# Patient Record
Sex: FEMALE | Race: WHITE | NOT HISPANIC OR LATINO | Employment: FULL TIME | URBAN - NONMETROPOLITAN AREA
[De-identification: names, ages, dates, MRNs, and addresses within clinical notes are randomized per-mention and may not be internally consistent; named-entity substitution may affect disease eponyms.]

---

## 2023-03-04 PROBLEM — G47.11 IDIOPATHIC HYPERSOMNOLENCE: Status: ACTIVE | Noted: 2023-03-04

## 2023-03-04 PROBLEM — R76.8 POSITIVE DOUBLE STRANDED DNA ANTIBODY TEST: Status: ACTIVE | Noted: 2023-03-04

## 2023-03-04 PROBLEM — F41.9 ANXIETY AND DEPRESSION: Status: ACTIVE | Noted: 2023-03-04

## 2023-03-04 PROBLEM — J32.9 CHRONIC SINUSITIS: Status: ACTIVE | Noted: 2023-03-04

## 2023-03-04 PROBLEM — H83.3X9 SOUND SENSITIVITY: Status: ACTIVE | Noted: 2023-03-04

## 2023-03-04 PROBLEM — G43.909 MIGRAINE HEADACHE: Status: ACTIVE | Noted: 2023-03-04

## 2023-03-04 PROBLEM — R53.82 CHRONIC FATIGUE: Status: ACTIVE | Noted: 2023-03-04

## 2023-03-04 PROBLEM — F32.A ANXIETY AND DEPRESSION: Status: ACTIVE | Noted: 2023-03-04

## 2023-03-04 PROBLEM — R51.9 CHRONIC DAILY HEADACHE: Status: ACTIVE | Noted: 2023-03-04

## 2023-03-04 PROBLEM — J30.9 ALLERGIC RHINITIS: Status: ACTIVE | Noted: 2023-03-04

## 2023-03-04 PROBLEM — G47.8 UPPER AIRWAY RESISTANCE SYNDROME: Status: ACTIVE | Noted: 2023-03-04

## 2023-03-04 PROBLEM — F84.5 ASPERGER'S DISORDER (HHS-HCC): Status: ACTIVE | Noted: 2023-03-04

## 2023-03-04 PROBLEM — E66.3 OVERWEIGHT (BMI 25.0-29.9): Status: ACTIVE | Noted: 2023-03-04

## 2023-03-04 PROBLEM — R61 GENERALIZED HYPERHIDROSIS: Status: ACTIVE | Noted: 2023-03-04

## 2023-03-04 RX ORDER — VENLAFAXINE HYDROCHLORIDE 37.5 MG/1
1 CAPSULE, EXTENDED RELEASE ORAL EVERY MORNING
COMMUNITY
Start: 2022-09-22 | End: 2024-01-18 | Stop reason: ALTCHOICE

## 2023-03-04 RX ORDER — NORETHINDRONE ACETATE AND ETHINYL ESTRADIOL .03; 1.5 MG/1; MG/1
TABLET ORAL
COMMUNITY

## 2023-03-06 NOTE — PROGRESS NOTES
Subjective   Patient ID: Clair Cifuentes is a 27 y.o. female who presents for Fatigue (Having severe fatigue x 3 weeks /Increase appetite hair thinning ).    HPI     Anxiety and depression- seeing Larisa at [facility]   She is on venlafaxine now- moods have been a lot better   Previously on lexapro and wellbutrin but have been stopped     Chronic daily headaches- saw neurology in Oct 2022- likely tension headache plus bupropion related HA vs migraine-   Tried amitriptyline, topamax previously   No longer taking magnesium/B2  Nurtec as needed, along with ibuprofen or tylenol  Getting 1-2 tension headaches per month, mostly due to weather changes - much better than they were     She was seeing sleep medicine in 2019 for chronic fatigue, home sleep study at that time was nondiagnostic for sleep apnea, AHI 1.16, oxygen saturation angeli 87%, also showed evidence of upper airway resistance syndrome ; reports she had in-lab study as well     Here today for acute on chronic fatigue x 2-3 weeks  Hair thinning  Hungry all the time   Restarted her iron with no change yet  Sleep is fine at night, 8 hours, sleeps through the night  Feels well rested upon awakening  Not napping  Some exercise- rides horses   Not recent illness   No hx mono or covid   Her mom has hypothyroidism     Labs done in July 2022     Review of Systems   Constitutional:  Positive for fatigue. Negative for chills and fever.   Respiratory:  Negative for cough and shortness of breath.    Cardiovascular:  Negative for chest pain and palpitations.   Endocrine: Positive for polyphagia.       Objective   /69 (BP Location: Right arm, Patient Position: Sitting, BP Cuff Size: Large adult)   Pulse 90   Temp 36.9 °C (98.5 °F) (Temporal)   Resp 14   Wt 96.8 kg (213 lb 6.4 oz)   LMP  (LMP Unknown) Comment: Cont BC  BMI 33.42 kg/m²     Physical Exam    Constitutional: Well developed, well nourished, alert and in no acute distress   Eyes: Normal external  exam. Pupils equally round and reactive to light with normal accommodation and extraocular movements intact.  Neck: Supple, no lymphadenopathy or masses.   Cardiovascular: Regular rate and rhythm, normal S1 and S2, no murmurs, gallops, or rubs. Radial pulses normal. No peripheral edema.  Pulmonary: No respiratory distress, lungs clear to auscultation bilaterally. No wheezes, rhonchi, rales.  Skin: Warm, well perfused, normal skin turgor and color.   Neurologic: Cranial nerves II-XII grossly intact.   Psychiatric: Mood calm and affect normal.      Assessment/Plan   Problem List Items Addressed This Visit          Nervous    Chronic daily headache     Improved, using Nurtec as needed             Respiratory    Upper airway resistance syndrome       Endocrine/Metabolic    Class 1 obesity without serious comorbidity with body mass index (BMI) of 33.0 to 33.9 in adult       Other    Anxiety and depression     Improved on venlafaxine - [facility]          Chronic fatigue - Primary     Acute on chronic fatigue  Labs ordered  Refer sleep medicine for further evaluation          Relevant Orders    CBC and Auto Differential    Comprehensive Metabolic Panel    Hemoglobin A1C    TSH with reflex to Free T4 if abnormal    Vitamin D, Total    Ferritin    Iron and TIBC    Vitamin B12    Michael-Barr Virus Antibody Panel (VCA IgG/IgM, EA IgG, NA IgG)    Referral to Adult Sleep Medicine     Other Visit Diagnoses       Hair thinning        Polyphagia              Candy Villegas,   3/7/2023

## 2023-03-07 ENCOUNTER — OFFICE VISIT (OUTPATIENT)
Dept: PRIMARY CARE | Facility: CLINIC | Age: 28
End: 2023-03-07
Payer: COMMERCIAL

## 2023-03-07 ENCOUNTER — LAB (OUTPATIENT)
Dept: LAB | Facility: LAB | Age: 28
End: 2023-03-07
Payer: COMMERCIAL

## 2023-03-07 VITALS
DIASTOLIC BLOOD PRESSURE: 69 MMHG | RESPIRATION RATE: 14 BRPM | BODY MASS INDEX: 33.42 KG/M2 | WEIGHT: 213.4 LBS | HEART RATE: 90 BPM | SYSTOLIC BLOOD PRESSURE: 112 MMHG | TEMPERATURE: 98.5 F

## 2023-03-07 DIAGNOSIS — F41.9 ANXIETY AND DEPRESSION: ICD-10-CM

## 2023-03-07 DIAGNOSIS — R53.82 CHRONIC FATIGUE: Primary | ICD-10-CM

## 2023-03-07 DIAGNOSIS — L65.9 HAIR THINNING: ICD-10-CM

## 2023-03-07 DIAGNOSIS — R53.82 CHRONIC FATIGUE: ICD-10-CM

## 2023-03-07 DIAGNOSIS — E66.9 CLASS 1 OBESITY WITHOUT SERIOUS COMORBIDITY WITH BODY MASS INDEX (BMI) OF 33.0 TO 33.9 IN ADULT, UNSPECIFIED OBESITY TYPE: ICD-10-CM

## 2023-03-07 DIAGNOSIS — G47.8 UPPER AIRWAY RESISTANCE SYNDROME: ICD-10-CM

## 2023-03-07 DIAGNOSIS — F32.A ANXIETY AND DEPRESSION: ICD-10-CM

## 2023-03-07 DIAGNOSIS — R51.9 CHRONIC DAILY HEADACHE: ICD-10-CM

## 2023-03-07 DIAGNOSIS — R63.2 POLYPHAGIA: ICD-10-CM

## 2023-03-07 PROBLEM — E66.3 OVERWEIGHT (BMI 25.0-29.9): Status: RESOLVED | Noted: 2023-03-04 | Resolved: 2023-03-07

## 2023-03-07 PROBLEM — E66.811 CLASS 1 OBESITY WITHOUT SERIOUS COMORBIDITY WITH BODY MASS INDEX (BMI) OF 33.0 TO 33.9 IN ADULT: Status: ACTIVE | Noted: 2023-03-07

## 2023-03-07 PROCEDURE — 1036F TOBACCO NON-USER: CPT | Performed by: FAMILY MEDICINE

## 2023-03-07 PROCEDURE — 82728 ASSAY OF FERRITIN: CPT

## 2023-03-07 PROCEDURE — 82607 VITAMIN B-12: CPT

## 2023-03-07 PROCEDURE — 83550 IRON BINDING TEST: CPT

## 2023-03-07 PROCEDURE — 86663 EPSTEIN-BARR ANTIBODY: CPT

## 2023-03-07 PROCEDURE — 36415 COLL VENOUS BLD VENIPUNCTURE: CPT

## 2023-03-07 PROCEDURE — 86665 EPSTEIN-BARR CAPSID VCA: CPT

## 2023-03-07 PROCEDURE — 86664 EPSTEIN-BARR NUCLEAR ANTIGEN: CPT

## 2023-03-07 PROCEDURE — 84443 ASSAY THYROID STIM HORMONE: CPT

## 2023-03-07 PROCEDURE — 3008F BODY MASS INDEX DOCD: CPT | Performed by: FAMILY MEDICINE

## 2023-03-07 PROCEDURE — 83540 ASSAY OF IRON: CPT

## 2023-03-07 PROCEDURE — 82306 VITAMIN D 25 HYDROXY: CPT

## 2023-03-07 PROCEDURE — 99214 OFFICE O/P EST MOD 30 MIN: CPT | Performed by: FAMILY MEDICINE

## 2023-03-07 ASSESSMENT — ENCOUNTER SYMPTOMS
POLYPHAGIA: 1
PALPITATIONS: 0
FATIGUE: 1
COUGH: 0
CHILLS: 0
FEVER: 0
SHORTNESS OF BREATH: 0

## 2023-03-08 LAB
CALCIDIOL (25 OH VITAMIN D3) (NG/ML) IN SER/PLAS: 49 NG/ML
COBALAMIN (VITAMIN B12) (PG/ML) IN SER/PLAS: 385 PG/ML (ref 211–911)
EBV INTERPRETATION: NORMAL
EPSTEIN-BARR VCA IGG: NEGATIVE
EPSTEIN-BARR VCA IGM: NEGATIVE
EPSTEIN-BARR VIRUS EARLY ANTIGEN ANTIBODY, IGG: NEGATIVE
EPSTIEN-BARR NUCLEAR ANTIGEN AB: NEGATIVE
FERRITIN (UG/LL) IN SER/PLAS: 78 UG/L (ref 8–150)
IRON (UG/DL) IN SER/PLAS: 135 UG/DL (ref 35–150)
IRON BINDING CAPACITY (UG/DL) IN SER/PLAS: 358 UG/DL (ref 240–445)
IRON SATURATION (%) IN SER/PLAS: 38 % (ref 25–45)
THYROTROPIN (MIU/L) IN SER/PLAS BY DETECTION LIMIT <= 0.05 MIU/L: 2.06 MIU/L (ref 0.44–3.98)

## 2024-01-17 PROBLEM — S42.309A FRACTURE OF UPPER EXTREMITY: Status: RESOLVED | Noted: 2021-05-21 | Resolved: 2024-01-17

## 2024-01-18 ENCOUNTER — ANCILLARY PROCEDURE (OUTPATIENT)
Dept: RADIOLOGY | Facility: CLINIC | Age: 29
End: 2024-01-18
Payer: COMMERCIAL

## 2024-01-18 ENCOUNTER — OFFICE VISIT (OUTPATIENT)
Dept: PRIMARY CARE | Facility: CLINIC | Age: 29
End: 2024-01-18
Payer: COMMERCIAL

## 2024-01-18 VITALS
RESPIRATION RATE: 16 BRPM | TEMPERATURE: 97.1 F | SYSTOLIC BLOOD PRESSURE: 136 MMHG | BODY MASS INDEX: 36.49 KG/M2 | HEIGHT: 67 IN | WEIGHT: 232.5 LBS | DIASTOLIC BLOOD PRESSURE: 83 MMHG | OXYGEN SATURATION: 98 % | HEART RATE: 101 BPM

## 2024-01-18 DIAGNOSIS — M25.551 CHRONIC RIGHT HIP PAIN: ICD-10-CM

## 2024-01-18 DIAGNOSIS — G89.29 CHRONIC RIGHT HIP PAIN: ICD-10-CM

## 2024-01-18 DIAGNOSIS — M25.551 CHRONIC RIGHT HIP PAIN: Primary | ICD-10-CM

## 2024-01-18 DIAGNOSIS — G89.29 CHRONIC RIGHT HIP PAIN: Primary | ICD-10-CM

## 2024-01-18 PROBLEM — H93.299 MISOPHONIA: Status: ACTIVE | Noted: 2024-01-18

## 2024-01-18 PROBLEM — H93.293 MISOPHONIA: Status: ACTIVE | Noted: 2024-01-18

## 2024-01-18 PROCEDURE — 3008F BODY MASS INDEX DOCD: CPT | Performed by: FAMILY MEDICINE

## 2024-01-18 PROCEDURE — 1036F TOBACCO NON-USER: CPT | Performed by: FAMILY MEDICINE

## 2024-01-18 PROCEDURE — 73502 X-RAY EXAM HIP UNI 2-3 VIEWS: CPT | Mod: RIGHT SIDE | Performed by: STUDENT IN AN ORGANIZED HEALTH CARE EDUCATION/TRAINING PROGRAM

## 2024-01-18 PROCEDURE — 73502 X-RAY EXAM HIP UNI 2-3 VIEWS: CPT | Mod: RT

## 2024-01-18 PROCEDURE — 99213 OFFICE O/P EST LOW 20 MIN: CPT | Performed by: FAMILY MEDICINE

## 2024-01-18 RX ORDER — DIPHENHYDRAMINE HCL 25 MG
25 TABLET ORAL EVERY 6 HOURS PRN
COMMUNITY
End: 2024-05-28 | Stop reason: ALTCHOICE

## 2024-01-18 RX ORDER — DULOXETIN HYDROCHLORIDE 30 MG/1
60 CAPSULE, DELAYED RELEASE ORAL DAILY
COMMUNITY
Start: 2023-11-29

## 2024-01-18 RX ORDER — ACETAMINOPHEN 500 MG
TABLET ORAL
COMMUNITY
Start: 2018-03-07

## 2024-01-18 RX ORDER — FERROUS SULFATE 325(65) MG
TABLET ORAL
COMMUNITY

## 2024-01-18 ASSESSMENT — ENCOUNTER SYMPTOMS
COLOR CHANGE: 0
CHILLS: 0
JOINT SWELLING: 0
FEVER: 0
ARTHRALGIAS: 1
BACK PAIN: 0

## 2024-01-18 NOTE — PROGRESS NOTES
"Subjective   Patient ID: Clair Cifuentes is a 28 y.o. female who presents for Hip Pain (Right hip pain, hurts riding horses and walking long distance /Started years ago).    HPI     Here for acute visit for hip pain for quite some time  Pain for years, thinks started in college  She recently got a horse and has been riding more but it has been causing pain   Pain right hip joint, anterior   No lateral or posterior pain   No back pain   Has tried stretches, aleve  Pain will start a mile or two into walking   No injuries   No prior xrays   Pain constant but fluctuates in severity  Now 1/10 ; at worst 6/10   Worse walking or horseback riding   No radiation   Able to sleep through the night  No limping      Current Outpatient Medications   Medication Sig Dispense Refill    cholecalciferol (Vitamin D-3) 50 mcg (2,000 unit) capsule Take by mouth.      diphenhydrAMINE (Sominex) 25 mg tablet Take 1 tablet (25 mg) by mouth every 6 hours if needed.      DULoxetine (Cymbalta) 30 mg DR capsule Take 1 capsule (30 mg) by mouth once daily.      ferrous sulfate, 325 mg ferrous sulfate, tablet Take by mouth.      norethindrone ac-eth estradioL (Loestrin) 1.5-30 mg-mcg tablet tablet Loestrin 1.5/30 (21) 1.5-30 MG-MCG Oral Tablet   Refills: 0       Active       No current facility-administered medications for this visit.       Review of Systems   Constitutional:  Negative for chills and fever.   Musculoskeletal:  Positive for arthralgias. Negative for back pain, gait problem and joint swelling.   Skin:  Negative for color change and rash.       Objective   /83 (BP Location: Left arm, Patient Position: Sitting, BP Cuff Size: Large adult)   Pulse 101   Temp 36.2 °C (97.1 °F) (Temporal)   Resp 16   Ht 1.702 m (5' 7\")   Wt 105 kg (232 lb 8 oz)   LMP  (LMP Unknown) Comment: cont bc  SpO2 98%   BMI 36.41 kg/m²     Physical Exam    Constitutional: Well developed, well nourished, alert and in no acute distress   Eyes: Normal " external exam.   HIP: Normal visual inspection, no erythema, warmth, or swelling noted. Full range of motion to internal and external rotation, flexion, and extension. No pain to palpation. Sensation intact. Muscle strength +5/5. Distal pulses normal. Gait normal.   Skin: Warm, well perfused, normal skin turgor and color.   Neurologic: Cranial nerves II-XII grossly intact.   Psychiatric: Mood calm and affect normal.      Assessment/Plan   Problem List Items Addressed This Visit    None  Visit Diagnoses         Codes    Chronic right hip pain    -  Primary M25.551, G89.29    Relevant Orders    Referral to Physical Therapy    XR hip right with pelvis when performed 2 or 3 views        Home exercises given  Refer PT   Xrays   Ibuprofen or aleve as needed     Candy Villegas, DO  1/18/2024

## 2024-01-20 ENCOUNTER — APPOINTMENT (OUTPATIENT)
Dept: PRIMARY CARE | Facility: CLINIC | Age: 29
End: 2024-01-20
Payer: COMMERCIAL

## 2024-05-23 ENCOUNTER — TELEPHONE (OUTPATIENT)
Dept: PRIMARY CARE | Facility: CLINIC | Age: 29
End: 2024-05-23
Payer: COMMERCIAL

## 2024-05-23 NOTE — TELEPHONE ENCOUNTER
Pt called because she is having issues with her heartbeat. Resting heartbeat has  beats per min for the past month. Pt states she gets dizzy when she is exercising. No other symptoms. Pt thinks it maybe her medication. I offered her an appointment tomorrow 5/24/24. Pt states she works. Please advise.

## 2024-05-24 NOTE — TELEPHONE ENCOUNTER
There are no appointments in our office today, we are closed tomorrow and Monday, and nothing on Tuesday. Should I advise pt go to urgent care/ER or okay to offer sick spot later in next week?

## 2024-05-24 NOTE — TELEPHONE ENCOUNTER
Schedule her in an acute slot next week    Instruct patient if symptoms worsen in interim then she needs to go to ER

## 2024-05-27 NOTE — PROGRESS NOTES
Subjective   Patient ID: Clair Cifuentes is a 28 y.o. female who presents for Rapid Heart Rate (Can feel her heart rate raising and will feel very weak /Watch states 150. ).    HPI     Here today for concerns with HR -   States resting has been  bpm x 1 month - may be longer   States will feel lightheaded at times- when riding her horse - states HR went up to 150 yesterday   States has also been higher than 120 at rest at least twice this month  Tracks on her iWatch  Will feel palpitations- describes it as racing sensation   No chest pain   No dyspnea   Not sure about family hx heart   Symptoms occurring daily   Has experienced this to some degree with anxiety in past but has always been able to take some deep breaths and HR would come down- not doing so now     Anxiety and depression- seeing Larisa at [facility]   She is on cymbalta since the fall 2023- dose increased to 60 mg  - then went to 90 mg- just this morning she saw her and decreased it back to 60 mg today     Contraception: OCP     Current Outpatient Medications   Medication Sig Dispense Refill    cholecalciferol (Vitamin D-3) 50 mcg (2,000 unit) capsule Take by mouth.      DULoxetine (Cymbalta) 30 mg DR capsule Take 2 capsules (60 mg) by mouth once daily.      ferrous sulfate, 325 mg ferrous sulfate, tablet Take by mouth.      norethindrone ac-eth estradioL (Loestrin) 1.5-30 mg-mcg tablet tablet Loestrin 1.5/30 (21) 1.5-30 MG-MCG Oral Tablet   Refills: 0       Active       No current facility-administered medications for this visit.       Review of Systems   Constitutional:  Positive for fatigue. Negative for chills and fever.   HENT:  Negative for congestion, ear pain and sore throat.    Eyes:  Negative for visual disturbance.   Respiratory:  Negative for cough and shortness of breath.    Cardiovascular:  Positive for palpitations. Negative for chest pain and leg swelling.   Gastrointestinal:  Negative for abdominal pain, constipation,  diarrhea, nausea and vomiting.   Genitourinary: Negative.    Musculoskeletal: Negative.    Skin:  Negative for rash.   Neurological:  Positive for light-headedness. Negative for dizziness, weakness, numbness and headaches.   Psychiatric/Behavioral:  The patient is nervous/anxious.              Objective   /80 (BP Location: Right arm, Patient Position: Sitting, BP Cuff Size: Large adult)   Pulse 97   Temp 36.6 °C (97.8 °F) (Temporal)   Resp 16   Wt 107 kg (235 lb 11.2 oz)   LMP  (LMP Unknown) Comment: Cont Birth cont  SpO2 97%   BMI 36.92 kg/m²     Physical Exam    Constitutional: Well developed, well nourished, alert and in no acute distress   Eyes: Normal external exam. Pupils equally round and reactive to light with normal accommodation and extraocular movements intact.  Neck: Supple, no lymphadenopathy or masses.   Cardiovascular: Regular rate and rhythm, normal S1 and S2, no murmurs, gallops, or rubs. Radial pulses normal. No peripheral edema.  Pulmonary: No respiratory distress, lungs clear to auscultation bilaterally. No wheezes, rhonchi, rales.  Skin: Warm, well perfused, normal skin turgor and color.   Neurologic: Cranial nerves II-XII grossly intact.   Psychiatric: Mood calm and affect normal.    Assessment/Plan   Problem List Items Addressed This Visit             ICD-10-CM    Anxiety and depression F41.9, F32.A     Managed by psychiatry- cymbalta dose just reduced back to 60 mg today   Doubt causing symptoms          Positive double stranded DNA antibody test R76.8    Relevant Orders    IRAIS with Reflex to ABDELRAHMAN    Class 2 obesity without serious comorbidity with body mass index (BMI) of 36.0 to 36.9 in adult E66.9, Z68.36     Other Visit Diagnoses         Codes    Tachycardia    -  Primary R00.0    Relevant Orders    ECG 12 lead (Clinic Performed) (Completed)    Holter or Event Cardiac Monitor    Referral to Cardiology    Lightheadedness     R42    Relevant Orders    Referral to Cardiology     Palpitations     R00.2    Relevant Orders    ECG 12 lead (Clinic Performed) (Completed)    CBC and Auto Differential    Comprehensive Metabolic Panel    TSH with reflex to Free T4 if abnormal    Magnesium    Holter or Event Cardiac Monitor    Referral to Cardiology          EKG today NSR  Looking back, her heart rate has been in the 90s for quite some time- past few years  Concern for possible SVT vs ectopic atrial rhythm vs other  Refer cardiology   48 hr holter monitor ordered       Candy Villegas,   5/28/2024

## 2024-05-28 ENCOUNTER — LAB (OUTPATIENT)
Dept: LAB | Facility: LAB | Age: 29
End: 2024-05-28
Payer: COMMERCIAL

## 2024-05-28 ENCOUNTER — OFFICE VISIT (OUTPATIENT)
Dept: PRIMARY CARE | Facility: CLINIC | Age: 29
End: 2024-05-28
Payer: COMMERCIAL

## 2024-05-28 VITALS
TEMPERATURE: 97.8 F | HEART RATE: 97 BPM | BODY MASS INDEX: 36.92 KG/M2 | WEIGHT: 235.7 LBS | SYSTOLIC BLOOD PRESSURE: 114 MMHG | DIASTOLIC BLOOD PRESSURE: 80 MMHG | OXYGEN SATURATION: 97 % | RESPIRATION RATE: 16 BRPM

## 2024-05-28 DIAGNOSIS — R00.2 PALPITATIONS: ICD-10-CM

## 2024-05-28 DIAGNOSIS — E66.9 CLASS 2 OBESITY WITHOUT SERIOUS COMORBIDITY WITH BODY MASS INDEX (BMI) OF 36.0 TO 36.9 IN ADULT, UNSPECIFIED OBESITY TYPE: ICD-10-CM

## 2024-05-28 DIAGNOSIS — F41.9 ANXIETY AND DEPRESSION: ICD-10-CM

## 2024-05-28 DIAGNOSIS — F32.A ANXIETY AND DEPRESSION: ICD-10-CM

## 2024-05-28 DIAGNOSIS — R76.8 POSITIVE DOUBLE STRANDED DNA ANTIBODY TEST: ICD-10-CM

## 2024-05-28 DIAGNOSIS — R42 LIGHTHEADEDNESS: ICD-10-CM

## 2024-05-28 DIAGNOSIS — R00.0 TACHYCARDIA: Primary | ICD-10-CM

## 2024-05-28 PROBLEM — E66.812 CLASS 2 OBESITY WITHOUT SERIOUS COMORBIDITY WITH BODY MASS INDEX (BMI) OF 36.0 TO 36.9 IN ADULT: Status: ACTIVE | Noted: 2023-03-07

## 2024-05-28 LAB
ALBUMIN SERPL BCP-MCNC: 4.7 G/DL (ref 3.4–5)
ALP SERPL-CCNC: 90 U/L (ref 33–110)
ALT SERPL W P-5'-P-CCNC: 15 U/L (ref 7–45)
ANION GAP SERPL CALC-SCNC: 15 MMOL/L (ref 10–20)
AST SERPL W P-5'-P-CCNC: 13 U/L (ref 9–39)
BASOPHILS # BLD AUTO: 0.06 X10*3/UL (ref 0–0.1)
BASOPHILS NFR BLD AUTO: 0.9 %
BILIRUB SERPL-MCNC: 0.8 MG/DL (ref 0–1.2)
BUN SERPL-MCNC: 14 MG/DL (ref 6–23)
CALCIUM SERPL-MCNC: 10 MG/DL (ref 8.6–10.6)
CHLORIDE SERPL-SCNC: 102 MMOL/L (ref 98–107)
CO2 SERPL-SCNC: 24 MMOL/L (ref 21–32)
CREAT SERPL-MCNC: 0.88 MG/DL (ref 0.5–1.05)
EGFRCR SERPLBLD CKD-EPI 2021: >90 ML/MIN/1.73M*2
EOSINOPHIL # BLD AUTO: 0.25 X10*3/UL (ref 0–0.7)
EOSINOPHIL NFR BLD AUTO: 3.6 %
ERYTHROCYTE [DISTWIDTH] IN BLOOD BY AUTOMATED COUNT: 13 % (ref 11.5–14.5)
GLUCOSE SERPL-MCNC: 96 MG/DL (ref 74–99)
HCT VFR BLD AUTO: 42.3 % (ref 36–46)
HGB BLD-MCNC: 13.7 G/DL (ref 12–16)
IMM GRANULOCYTES # BLD AUTO: 0.02 X10*3/UL (ref 0–0.7)
IMM GRANULOCYTES NFR BLD AUTO: 0.3 % (ref 0–0.9)
LYMPHOCYTES # BLD AUTO: 2.25 X10*3/UL (ref 1.2–4.8)
LYMPHOCYTES NFR BLD AUTO: 32.2 %
MAGNESIUM SERPL-MCNC: 2.25 MG/DL (ref 1.6–2.4)
MCH RBC QN AUTO: 27.7 PG (ref 26–34)
MCHC RBC AUTO-ENTMCNC: 32.4 G/DL (ref 32–36)
MCV RBC AUTO: 86 FL (ref 80–100)
MONOCYTES # BLD AUTO: 0.4 X10*3/UL (ref 0.1–1)
MONOCYTES NFR BLD AUTO: 5.7 %
NEUTROPHILS # BLD AUTO: 4.01 X10*3/UL (ref 1.2–7.7)
NEUTROPHILS NFR BLD AUTO: 57.3 %
NRBC BLD-RTO: 0 /100 WBCS (ref 0–0)
PLATELET # BLD AUTO: 468 X10*3/UL (ref 150–450)
POTASSIUM SERPL-SCNC: 4.4 MMOL/L (ref 3.5–5.3)
PROT SERPL-MCNC: 7.7 G/DL (ref 6.4–8.2)
RBC # BLD AUTO: 4.94 X10*6/UL (ref 4–5.2)
SODIUM SERPL-SCNC: 137 MMOL/L (ref 136–145)
TSH SERPL-ACNC: 1.02 MIU/L (ref 0.44–3.98)
WBC # BLD AUTO: 7 X10*3/UL (ref 4.4–11.3)

## 2024-05-28 PROCEDURE — 99214 OFFICE O/P EST MOD 30 MIN: CPT | Performed by: FAMILY MEDICINE

## 2024-05-28 PROCEDURE — 84443 ASSAY THYROID STIM HORMONE: CPT

## 2024-05-28 PROCEDURE — 86038 ANTINUCLEAR ANTIBODIES: CPT

## 2024-05-28 PROCEDURE — 1036F TOBACCO NON-USER: CPT | Performed by: FAMILY MEDICINE

## 2024-05-28 PROCEDURE — 80053 COMPREHEN METABOLIC PANEL: CPT

## 2024-05-28 PROCEDURE — 36415 COLL VENOUS BLD VENIPUNCTURE: CPT

## 2024-05-28 PROCEDURE — 93000 ELECTROCARDIOGRAM COMPLETE: CPT | Performed by: FAMILY MEDICINE

## 2024-05-28 PROCEDURE — 83735 ASSAY OF MAGNESIUM: CPT

## 2024-05-28 PROCEDURE — 85025 COMPLETE CBC W/AUTO DIFF WBC: CPT

## 2024-05-28 PROCEDURE — 86235 NUCLEAR ANTIGEN ANTIBODY: CPT

## 2024-05-28 PROCEDURE — 3008F BODY MASS INDEX DOCD: CPT | Performed by: FAMILY MEDICINE

## 2024-05-28 PROCEDURE — 86225 DNA ANTIBODY NATIVE: CPT

## 2024-05-28 ASSESSMENT — ENCOUNTER SYMPTOMS
SORE THROAT: 0
ABDOMINAL PAIN: 0
LIGHT-HEADEDNESS: 1
FEVER: 0
HEADACHES: 0
MUSCULOSKELETAL NEGATIVE: 1
VOMITING: 0
FATIGUE: 1
NUMBNESS: 0
NERVOUS/ANXIOUS: 1
CHILLS: 0
DIZZINESS: 0
SHORTNESS OF BREATH: 0
PALPITATIONS: 1
NAUSEA: 0
DIARRHEA: 0
WEAKNESS: 0
CONSTIPATION: 0
COUGH: 0

## 2024-05-30 LAB
ANA PATTERN: ABNORMAL
ANA SER QL HEP2 SUBST: POSITIVE
ANA TITR SER IF: ABNORMAL {TITER}
CENTROMERE B AB SER-ACNC: <0.2 AI
CHROMATIN AB SERPL-ACNC: <0.2 AI
DSDNA AB SER-ACNC: 13 IU/ML
ENA JO1 AB SER QL IA: <0.2 AI
ENA RNP AB SER IA-ACNC: 0.3 AI
ENA SCL70 AB SER QL IA: <0.2 AI
ENA SM AB SER IA-ACNC: <0.2 AI
ENA SM+RNP AB SER QL IA: <0.2 AI
ENA SS-A AB SER IA-ACNC: <0.2 AI
ENA SS-B AB SER IA-ACNC: <0.2 AI
RIBOSOMAL P AB SER-ACNC: <0.2 AI

## 2024-06-02 PROBLEM — R76.8 POSITIVE ANA (ANTINUCLEAR ANTIBODY): Status: ACTIVE | Noted: 2024-06-02

## 2024-06-11 PROBLEM — J30.9 ALLERGIC RHINITIS: Status: RESOLVED | Noted: 2023-03-04 | Resolved: 2024-06-11

## 2024-06-18 ENCOUNTER — HOSPITAL ENCOUNTER (OUTPATIENT)
Dept: CARDIOLOGY | Facility: CLINIC | Age: 29
Discharge: HOME | End: 2024-06-18
Payer: COMMERCIAL

## 2024-06-18 DIAGNOSIS — R00.2 PALPITATIONS: ICD-10-CM

## 2024-06-18 DIAGNOSIS — R00.0 TACHYCARDIA: ICD-10-CM

## 2024-06-25 ENCOUNTER — OFFICE VISIT (OUTPATIENT)
Dept: CARDIOLOGY | Facility: CLINIC | Age: 29
End: 2024-06-25
Payer: COMMERCIAL

## 2024-06-25 VITALS
OXYGEN SATURATION: 98 % | HEART RATE: 97 BPM | BODY MASS INDEX: 37.51 KG/M2 | SYSTOLIC BLOOD PRESSURE: 133 MMHG | HEIGHT: 67 IN | DIASTOLIC BLOOD PRESSURE: 91 MMHG | WEIGHT: 239 LBS

## 2024-06-25 DIAGNOSIS — R00.0 TACHYCARDIA: Primary | ICD-10-CM

## 2024-06-25 DIAGNOSIS — R42 LIGHTHEADEDNESS: ICD-10-CM

## 2024-06-25 DIAGNOSIS — R00.2 PALPITATIONS: ICD-10-CM

## 2024-06-25 PROCEDURE — 3008F BODY MASS INDEX DOCD: CPT | Performed by: STUDENT IN AN ORGANIZED HEALTH CARE EDUCATION/TRAINING PROGRAM

## 2024-06-25 PROCEDURE — 1036F TOBACCO NON-USER: CPT | Performed by: STUDENT IN AN ORGANIZED HEALTH CARE EDUCATION/TRAINING PROGRAM

## 2024-06-25 PROCEDURE — 99204 OFFICE O/P NEW MOD 45 MIN: CPT | Performed by: STUDENT IN AN ORGANIZED HEALTH CARE EDUCATION/TRAINING PROGRAM

## 2024-06-25 PROCEDURE — 99214 OFFICE O/P EST MOD 30 MIN: CPT | Performed by: STUDENT IN AN ORGANIZED HEALTH CARE EDUCATION/TRAINING PROGRAM

## 2024-06-25 ASSESSMENT — PAIN SCALES - GENERAL: PAINLEVEL: 0-NO PAIN

## 2024-06-25 NOTE — PROGRESS NOTES
Referred by Dr. Villegas for Establish Care     History Of Present Illness:    Clair Cifuentes is a 28 y.o. female presents to clinic for evaluation of tachycardia.  Patient has been dealing with elevated heart rates from drinking for the last several months.  This initially began to get in the year where she is no she is has faster heart rates by her March.  She denies palpitations or shortness of breath.  No chest discomfort.  No syncopal events occasional dizzy spells.  When she rides horses she can notice that her heart rate to the upper 100s close to 200.  She has no recent sick contacts although she had COVID in November 2023.  She does not have family history of accelerated cardiovascular events.  Most recent medications reviewed notably she previously was on iron sulfate for iron deficiency but has been off of this therapy.  However when her heart rate started to increase she did start taking the medication.  Patient denies tobacco or heavy ethanol consumption.  She does drink about a 20 ounce bottle of Coke daily.  Most recent labs shows TSH 1.02.  Positive IRAIS with antidouble-stranded DNA 13.  Most recent CBC showed a white count of 7, globin 13.7 and platelet count 468.      Past Medical History:  She has a past medical history of Abnormal level of blood mineral (12/20/2018), Anxiety, Depression, Fracture of upper extremity (05/21/2021), Headache, Nonscarring hair loss, unspecified (12/20/2018), Nonscarring hair loss, unspecified (12/18/2017), Personal history of other diseases of the nervous system and sense organs (07/12/2018), Personal history of other diseases of the nervous system and sense organs (10/01/2020), Personal history of other endocrine, nutritional and metabolic disease (12/20/2018), Screening for cervical cancer (05/27/2022), and Unspecified fracture of shaft of humerus, left arm, sequela.    Past Surgical History:  She has a past surgical history that includes Other surgical history  "(12/18/2017); Other surgical history (10/21/2022); and Fracture surgery (Feburary 2016).      Social History:  She reports that she has never smoked. She has never been exposed to tobacco smoke. She has never used smokeless tobacco. She reports current alcohol use. She reports that she does not use drugs.    Family History:  Family History   Problem Relation Name Age of Onset    Anxiety disorder Mother Ruth Cifuentes     Depression Mother Ruth Cifuentes     Hyperlipidemia Mother Ruth Gallegosbefidelia     Hypothyroidism Mother Ruth Cifuentes     Breast cancer Mother's Sister Silvia Arita     Ovarian cancer Mother's Sister Silvia Arita     Lung cancer Maternal Grandfather      Other (urinary bladder cancer) Maternal Grandfather      Heart attack Maternal Grandfather      Pancreatic cancer Maternal Grandfather      Breast cancer Paternal Grandmother Abril cifuentes     Heart attack Paternal Grandfather          Allergies:  Patient has no known allergies.    Outpatient Medications:  Current Outpatient Medications   Medication Instructions    cholecalciferol (Vitamin D-3) 50 mcg (2,000 unit) capsule oral    DULoxetine (CYMBALTA) 60 mg, oral, Daily    ferrous sulfate, 325 mg ferrous sulfate, tablet oral    norethindrone ac-eth estradioL (Loestrin) 1.5-30 mg-mcg tablet tablet Loestrin 1.5/30 (21) 1.5-30 MG-MCG Oral Tablet   Refills: 0       Active        Last Recorded Vitals:  Vitals:    06/25/24 1519   BP: (!) 133/91   BP Location: Right arm   Patient Position: Sitting   Pulse: 97   SpO2: 98%   Weight: 108 kg (239 lb)   Height: 1.702 m (5' 7\")       Physical Exam:  General: No acute distress,  A&O x3, parents are present  Skin: Warm and dry  Neck: JVD is not elevated  ENT: Moist mucous membranes no lesions appreciated  Pulmonary: CTAB  Cards: Regular rate rhythm, no murmurs gallops or rubs appreciated normal S1-S2  Abdomen: Soft nontender nondistended  Extremities: No edema or cyanosis  Psych: Appropriate mood and affect     Last " Labs:  CBC -  Lab Results   Component Value Date    WBC 7.0 05/28/2024    HGB 13.7 05/28/2024    HCT 42.3 05/28/2024    MCV 86 05/28/2024     (H) 05/28/2024       CMP -  Lab Results   Component Value Date    CALCIUM 10.0 05/28/2024    PROT 7.7 05/28/2024    ALBUMIN 4.7 05/28/2024    AST 13 05/28/2024    ALT 15 05/28/2024    ALKPHOS 90 05/28/2024    BILITOT 0.8 05/28/2024       LIPID PANEL -   Lab Results   Component Value Date    CHOL 169 07/27/2022    TRIG 83 07/27/2022    HDL 64.7 07/27/2022    CHHDL 2.6 07/27/2022    LDLF 88 07/27/2022    VLDL 17 07/27/2022    NHDL 110 12/04/2020       RENAL FUNCTION PANEL -   Lab Results   Component Value Date    GLUCOSE 96 05/28/2024     05/28/2024    K 4.4 05/28/2024     05/28/2024    CO2 24 05/28/2024    ANIONGAP 15 05/28/2024    BUN 14 05/28/2024    CREATININE 0.88 05/28/2024    CALCIUM 10.0 05/28/2024    ALBUMIN 4.7 05/28/2024        Lab Results   Component Value Date    HGBA1C 5.0 07/27/2022     Assessment/Plan     1.  Tachycardia: Unclear etiology.  Baseline ECG of sinus rhythm normal axis appropriately progression.  Monitor symptoms at baseline.  No true correlation with position or activity.  Time course relationship with prior exposure to COVID in November 2023.  Additional workup as below:  -Follow-up 48-hour Holter monitor results  -Echocardiogram  -Table testing  -Recheck of iron studies  Additional recommendations to follow based on study findings    (This note was generated with voice recognition software and may contain errors including spelling, grammar, syntax and missed recognition of what was dictated, of which may not have been fully corrected)       Aubrey Lewis MD PhD

## 2024-07-17 ENCOUNTER — HOSPITAL ENCOUNTER (OUTPATIENT)
Dept: NEUROLOGY | Facility: HOSPITAL | Age: 29
End: 2024-07-17
Payer: COMMERCIAL

## 2024-07-19 ENCOUNTER — HOSPITAL ENCOUNTER (OUTPATIENT)
Dept: CARDIOLOGY | Facility: CLINIC | Age: 29
Discharge: HOME | End: 2024-07-19
Payer: COMMERCIAL

## 2024-07-19 DIAGNOSIS — R00.0 TACHYCARDIA: ICD-10-CM

## 2024-07-19 DIAGNOSIS — R00.2 PALPITATIONS: ICD-10-CM

## 2024-07-19 DIAGNOSIS — R42 LIGHTHEADEDNESS: ICD-10-CM

## 2024-07-19 PROCEDURE — 2500000004 HC RX 250 GENERAL PHARMACY W/ HCPCS (ALT 636 FOR OP/ED): Performed by: STUDENT IN AN ORGANIZED HEALTH CARE EDUCATION/TRAINING PROGRAM

## 2024-07-19 PROCEDURE — 93306 TTE W/DOPPLER COMPLETE: CPT

## 2024-07-22 LAB
AORTIC VALVE MEAN GRADIENT: 3.5 MMHG
AORTIC VALVE PEAK VELOCITY: 1.37 M/S
AV PEAK GRADIENT: 7.5 MMHG
AVA (PEAK VEL): 2.8 CM2
AVA (VTI): 3.17 CM2
EJECTION FRACTION APICAL 4 CHAMBER: 65.5
EJECTION FRACTION: 63 %
LEFT ATRIUM VOLUME AREA LENGTH INDEX BSA: 23.6 ML/M2
LEFT VENTRICLE INTERNAL DIMENSION DIASTOLE: 4.1 CM (ref 3.5–6)
LEFT VENTRICULAR OUTFLOW TRACT DIAMETER: 2.04 CM
MITRAL VALVE E/A RATIO: 1.08
MITRAL VALVE E/E' RATIO: 4.66
TRICUSPID ANNULAR PLANE SYSTOLIC EXCURSION: 2 CM

## 2024-07-24 ENCOUNTER — LAB (OUTPATIENT)
Dept: LAB | Facility: LAB | Age: 29
End: 2024-07-24
Payer: COMMERCIAL

## 2024-07-24 DIAGNOSIS — R42 LIGHTHEADEDNESS: ICD-10-CM

## 2024-07-24 DIAGNOSIS — R00.2 PALPITATIONS: ICD-10-CM

## 2024-07-24 DIAGNOSIS — R00.0 TACHYCARDIA: ICD-10-CM

## 2024-07-24 LAB
FERRITIN SERPL-MCNC: 39 NG/ML (ref 8–150)
IRON SATN MFR SERPL: 11 % (ref 25–45)
IRON SERPL-MCNC: 42 UG/DL (ref 35–150)
TIBC SERPL-MCNC: 388 UG/DL (ref 240–445)
UIBC SERPL-MCNC: 346 UG/DL (ref 110–370)

## 2024-07-24 PROCEDURE — 82728 ASSAY OF FERRITIN: CPT

## 2024-07-24 PROCEDURE — 36415 COLL VENOUS BLD VENIPUNCTURE: CPT

## 2024-07-24 PROCEDURE — 83550 IRON BINDING TEST: CPT

## 2024-07-24 PROCEDURE — 83540 ASSAY OF IRON: CPT

## 2024-08-20 ENCOUNTER — APPOINTMENT (OUTPATIENT)
Dept: CARDIOLOGY | Facility: CLINIC | Age: 29
End: 2024-08-20
Payer: COMMERCIAL

## 2024-08-28 ENCOUNTER — APPOINTMENT (OUTPATIENT)
Dept: NEUROLOGY | Facility: HOSPITAL | Age: 29
End: 2024-08-28
Payer: COMMERCIAL

## 2024-08-28 ENCOUNTER — HOSPITAL ENCOUNTER (OUTPATIENT)
Dept: NEUROLOGY | Facility: HOSPITAL | Age: 29
Discharge: HOME | End: 2024-08-28
Payer: COMMERCIAL

## 2024-08-28 DIAGNOSIS — R42 LIGHTHEADEDNESS: ICD-10-CM

## 2024-08-28 DIAGNOSIS — R00.0 TACHYCARDIA: ICD-10-CM

## 2024-08-28 DIAGNOSIS — R00.2 PALPITATIONS: ICD-10-CM

## 2024-08-28 PROCEDURE — 95923 AUTONOMIC NRV SYST FUNJ TEST: CPT | Performed by: STUDENT IN AN ORGANIZED HEALTH CARE EDUCATION/TRAINING PROGRAM

## 2024-08-28 PROCEDURE — 95924 ANS PARASYMP & SYMP W/TILT: CPT | Performed by: STUDENT IN AN ORGANIZED HEALTH CARE EDUCATION/TRAINING PROGRAM

## 2024-09-10 ENCOUNTER — TELEMEDICINE (OUTPATIENT)
Dept: CARDIOLOGY | Facility: CLINIC | Age: 29
End: 2024-09-10
Payer: COMMERCIAL

## 2024-09-10 DIAGNOSIS — G90.A POTS (POSTURAL ORTHOSTATIC TACHYCARDIA SYNDROME): Primary | ICD-10-CM

## 2024-09-10 DIAGNOSIS — R00.0 TACHYCARDIA: ICD-10-CM

## 2024-09-10 PROCEDURE — 99214 OFFICE O/P EST MOD 30 MIN: CPT | Performed by: STUDENT IN AN ORGANIZED HEALTH CARE EDUCATION/TRAINING PROGRAM

## 2024-09-10 RX ORDER — PROPRANOLOL HYDROCHLORIDE 60 MG/1
60 CAPSULE, EXTENDED RELEASE ORAL DAILY
Qty: 30 CAPSULE | Refills: 11 | Status: SHIPPED | OUTPATIENT
Start: 2024-09-10 | End: 2025-09-10

## 2024-09-10 NOTE — PROGRESS NOTES
Chief Complaint:   No chief complaint on file.     History Of Present Illness:    Clair Cifuentes is a 28 y.o. female presents for virtual visit.  Patient underwent testing which showed a low binding of iron and she is placed on ferrous sulfate pills.  Echo showed normal ejection fraction without significant valve pathology.  She wore a Holter monitor which showed a resting heart rate that averaged 200 bpm.  Subsequently autonomic testing was consistent with POTS.  Notably patient felt better while off of Cymbalta.     Last Recorded Vitals:  There were no vitals filed for this visit.    Review of Systems  ROS      Allergies:  Patient has no known allergies.    Outpatient Medications:  Current Outpatient Medications   Medication Instructions    cholecalciferol (Vitamin D-3) 50 mcg (2,000 unit) capsule oral    DULoxetine (CYMBALTA) 60 mg, oral, Daily    ferrous sulfate, 325 mg ferrous sulfate, tablet oral    norethindrone ac-eth estradioL (Loestrin) 1.5-30 mg-mcg tablet tablet Loestrin 1.5/30 (21) 1.5-30 MG-MCG Oral Tablet   Refills: 0       Active    propranolol LA (INDERAL LA) 60 mg, oral, Daily, Do not crush, chew, or split.       Physical Exam:  Virtual visit.     Last Labs:  CBC -  Lab Results   Component Value Date    WBC 7.0 05/28/2024    HGB 13.7 05/28/2024    HCT 42.3 05/28/2024    MCV 86 05/28/2024     (H) 05/28/2024       CMP -  Lab Results   Component Value Date    CALCIUM 10.0 05/28/2024    PROT 7.7 05/28/2024    ALBUMIN 4.7 05/28/2024    AST 13 05/28/2024    ALT 15 05/28/2024    ALKPHOS 90 05/28/2024    BILITOT 0.8 05/28/2024       LIPID PANEL -   Lab Results   Component Value Date    CHOL 169 07/27/2022    TRIG 83 07/27/2022    HDL 64.7 07/27/2022    CHHDL 2.6 07/27/2022    LDLF 88 07/27/2022    VLDL 17 07/27/2022    NHDL 110 12/04/2020       RENAL FUNCTION PANEL -   Lab Results   Component Value Date    GLUCOSE 96 05/28/2024     05/28/2024    K 4.4 05/28/2024     05/28/2024    CO2  24 05/28/2024    ANIONGAP 15 05/28/2024    BUN 14 05/28/2024    CREATININE 0.88 05/28/2024    CALCIUM 10.0 05/28/2024    ALBUMIN 4.7 05/28/2024        Lab Results   Component Value Date    HGBA1C 5.0 07/27/2022       Last Cardiology Tests:  ECG:  Encounter Date: 05/28/24   ECG 12 lead (Clinic Performed)    Narrative    NSR, rate 98, no acute ST-T wave changes         Echo:  No results found for this or any previous visit from the past 1095 days.       Ejection Fractions:  EF   Date/Time Value Ref Range Status   07/19/2024 02:51 PM 63 %      Assessment/Plan  1.  POTS: Tachycardia:   Symptoms began after exposure to COVID in November 2023.  Baseline ECG of sinus rhythm normal axis appropriately progression.  Holter monitor showed a resting heart rate that averaged 102 bpm.  Tilt table test consistent with POTS.      -symptoms are improved off of SSRIs   -Advised aggressive hydration and exercise as tolerated  -Avoid excessive caffeine or alcohol consumption  -Utilize compression hoses  -Start propranolol long-acting formulation of 60 mg once a day  -Iron supplements for iron insufficiency    Follow-up in 3 months for assessment    (This note was generated with voice recognition software and may contain errors including spelling, grammar, syntax and missed recognition of what was dictated, of which may not have been fully corrected)      Aubrey Lewis MD PhD

## 2024-10-10 ENCOUNTER — TELEPHONE (OUTPATIENT)
Dept: CARDIOLOGY | Facility: CLINIC | Age: 29
End: 2024-10-10
Payer: COMMERCIAL

## 2024-10-10 DIAGNOSIS — R00.0 TACHYCARDIA: ICD-10-CM

## 2024-10-10 NOTE — TELEPHONE ENCOUNTER
"Per my chart message...    \"I have a few questions about side effects. I have been really irritable lately could that be a side effect? 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 I’ve also been napping a lot more after work but over all not as fatigued at work so that is both good and bad since I can’t do chores if I am asleep.     Also do some side effects get better over time? I’ve been having some minor bathroom issues that are fine to deal with but it can be annoying to need to use the restroom more urgently than normal. I am more concerned with the irritability than I am with this but it is still something I don’t enjoy.     On the plus side my watch has said my heart rate has decrease but I don’t really feel any different.\"       "

## 2024-10-10 NOTE — TELEPHONE ENCOUNTER
"Reviewed with Dr. Lewis-\"Yes she can stop it and try atenolol 25mg once a day\"  Minderestt message sent to patient with instruction.  "

## 2024-10-11 RX ORDER — ATENOLOL 25 MG/1
25 TABLET ORAL DAILY
Qty: 30 TABLET | Refills: 11 | Status: SHIPPED | OUTPATIENT
Start: 2024-10-11 | End: 2025-10-11

## 2024-10-29 ENCOUNTER — APPOINTMENT (OUTPATIENT)
Dept: CARDIOLOGY | Facility: CLINIC | Age: 29
End: 2024-10-29
Payer: COMMERCIAL

## 2024-12-18 ENCOUNTER — TELEMEDICINE (OUTPATIENT)
Dept: CARDIOLOGY | Facility: CLINIC | Age: 29
End: 2024-12-18
Payer: COMMERCIAL

## 2024-12-18 VITALS — WEIGHT: 250 LBS | HEIGHT: 67 IN | BODY MASS INDEX: 39.24 KG/M2

## 2024-12-18 DIAGNOSIS — G90.A POTS (POSTURAL ORTHOSTATIC TACHYCARDIA SYNDROME): Primary | ICD-10-CM

## 2024-12-18 DIAGNOSIS — R00.0 TACHYCARDIA: ICD-10-CM

## 2024-12-18 DIAGNOSIS — E61.1 LOW IRON: ICD-10-CM

## 2024-12-18 PROCEDURE — 3008F BODY MASS INDEX DOCD: CPT | Performed by: STUDENT IN AN ORGANIZED HEALTH CARE EDUCATION/TRAINING PROGRAM

## 2024-12-18 PROCEDURE — 1036F TOBACCO NON-USER: CPT | Performed by: STUDENT IN AN ORGANIZED HEALTH CARE EDUCATION/TRAINING PROGRAM

## 2024-12-18 PROCEDURE — 99214 OFFICE O/P EST MOD 30 MIN: CPT | Performed by: STUDENT IN AN ORGANIZED HEALTH CARE EDUCATION/TRAINING PROGRAM

## 2024-12-18 RX ORDER — NORETHINDRONE ACETATE AND ETHINYL ESTRADIOL, ETHINYL ESTRADIOL AND FERROUS FUMARATE 1MG-10(24)
1 KIT ORAL DAILY
COMMUNITY
Start: 2024-12-05

## 2024-12-18 RX ORDER — CITALOPRAM 20 MG/1
20 TABLET, FILM COATED ORAL DAILY
COMMUNITY
Start: 2024-12-03

## 2024-12-18 RX ORDER — DULOXETIN HYDROCHLORIDE 60 MG/1
1 CAPSULE, DELAYED RELEASE ORAL
COMMUNITY
Start: 2024-07-25 | End: 2024-12-18 | Stop reason: WASHOUT

## 2024-12-18 RX ORDER — ATENOLOL 25 MG/1
12.5 TABLET ORAL DAILY
Qty: 15 TABLET | Refills: 11 | Status: SHIPPED | OUTPATIENT
Start: 2025-01-17 | End: 2026-01-17

## 2024-12-18 NOTE — PROGRESS NOTES
"Chief Complaint:   Postural Orthostatic Tachycardia Syndrome (Pots) and Rapid Heart Rate (3 month follow up)     History Of Present Illness:    Clair Cifuentes is a 29 y.o. female presents for virtual visit.  Patient was initially placed on propranolol but developed severe fatigue and some loose stools.  She is switched to atenolol 25 mg once a day which has also helped improve her symptoms but she does still have some fatigue.  She sleeps more often than normal.  Her blood work showed the presence of low iron binding she was placed on ferrous sulfate.  She denies chest pain or shortness of breath.  No syncopal events.  She is knows her heart rate is improved with medication.  She is no longer on Cymbalta but is currently taking Celexa 20 mg daily.     Last Recorded Vitals:  Vitals:    12/18/24 1450   Weight: 113 kg (250 lb)   Height: 1.702 m (5' 7\")       Review of Systems  ROS      Allergies:  Patient has no known allergies.    Outpatient Medications:  Current Outpatient Medications   Medication Instructions    [START ON 1/17/2025] atenolol (TENORMIN) 12.5 mg, oral, Daily    cholecalciferol (Vitamin D-3) 50 mcg (2,000 unit) capsule Take by mouth.    citalopram (CELEXA) 20 mg, Daily    ferrous sulfate, 325 mg ferrous sulfate, tablet Take by mouth.    Lo Loestrin Fe 1 mg-10 mcg (24)/10 mcg (2) tablet 1 tablet, Daily       Physical Exam:  Virtual visit     Last Labs:  CBC -  Lab Results   Component Value Date    WBC 7.0 05/28/2024    HGB 13.7 05/28/2024    HCT 42.3 05/28/2024    MCV 86 05/28/2024     (H) 05/28/2024       CMP -  Lab Results   Component Value Date    CALCIUM 10.0 05/28/2024    PROT 7.7 05/28/2024    ALBUMIN 4.7 05/28/2024    AST 13 05/28/2024    ALT 15 05/28/2024    ALKPHOS 90 05/28/2024    BILITOT 0.8 05/28/2024       LIPID PANEL -   Lab Results   Component Value Date    CHOL 169 07/27/2022    TRIG 83 07/27/2022    HDL 64.7 07/27/2022    CHHDL 2.6 07/27/2022    LDLF 88 07/27/2022    VLDL 17 " 07/27/2022    NHDL 110 12/04/2020       RENAL FUNCTION PANEL -   Lab Results   Component Value Date    GLUCOSE 96 05/28/2024     05/28/2024    K 4.4 05/28/2024     05/28/2024    CO2 24 05/28/2024    ANIONGAP 15 05/28/2024    BUN 14 05/28/2024    CREATININE 0.88 05/28/2024    CALCIUM 10.0 05/28/2024    ALBUMIN 4.7 05/28/2024        Lab Results   Component Value Date    HGBA1C 5.0 07/27/2022       Last Cardiology Tests:  ECG:  No results found for this or any previous visit (from the past 4464 hours).     Echo:  No results found for this or any previous visit from the past 1095 days.       Ejection Fractions:  EF   Date/Time Value Ref Range Status   07/19/2024 02:51 PM 63 %        Cath:  CV NCDR CATHPCI V5 COLLECTION FORM     Assessment and Plan    1.  POTS: Tachycardia:   Symptoms began after exposure to COVID in November 2023.  Baseline ECG of sinus rhythm normal axis appropriately progression.  Holter monitor showed a resting heart rate that averaged 102 bpm.  Tilt table test consistent with POTS.       -symptoms are improved off of Cymbalta  -Intolerant to propranolol due to severe fatigue  -Currently tolerating atenolol but does complain of fatigue so we will reduce atenolol to 12.5 mg once a day -if fatigue persist will consider use of ivabradine or even calcium channel blocker such as Cardizem  -Advised aggressive hydration and exercise as tolerated  -Avoid excessive caffeine or alcohol consumption  -Utilize compression hoses  -Low iron binding -given ferrous sulfate for iron replenishment.  Will recheck iron studies     Follow-up in 6 months for assessment     (This note was generated with voice recognition software and may contain errors including spelling, grammar, syntax and missed recognition of what was dictated, of which may not have been fully corrected)     Aubrey Lewis MD PhD

## 2025-03-05 ENCOUNTER — PATIENT MESSAGE (OUTPATIENT)
Dept: CARDIOLOGY | Facility: CLINIC | Age: 30
End: 2025-03-05
Payer: COMMERCIAL

## 2025-03-07 NOTE — PATIENT COMMUNICATION
Called patient to discuss. States HR/BP have been normal. Patient states she is concerned about shortness of breath with exertion (e.g. walking to her car, showering, etc.). States this started in January - unsure if it is r/t having COVID last week of December or if it is r/t POTS. Patient currently taking Atenolol 12.5mg. Made patient aware will discuss with Dr. Lewis.

## 2025-03-11 DIAGNOSIS — G90.A POTS (POSTURAL ORTHOSTATIC TACHYCARDIA SYNDROME): ICD-10-CM

## 2025-03-11 RX ORDER — IVABRADINE 5 MG/1
2.5 TABLET, FILM COATED ORAL 2 TIMES DAILY
Qty: 90 TABLET | Refills: 3 | Status: SHIPPED | OUTPATIENT
Start: 2025-03-11

## 2025-03-11 NOTE — TELEPHONE ENCOUNTER
PRIOR AUTH FOR CORLANOR 5mg approved (1/2 tab BID)     PRIOR AUTH APPROVED CaseId:[id number];Status:Approved;Review Type:Prior Auth;Coverage Start Date:03/11/2025;Coverage End Date:03/11/2026

## 2025-03-25 DIAGNOSIS — G90.A POTS (POSTURAL ORTHOSTATIC TACHYCARDIA SYNDROME): ICD-10-CM

## 2025-03-25 RX ORDER — IVABRADINE 5 MG/1
2.5 TABLET, FILM COATED ORAL 2 TIMES DAILY
Qty: 90 TABLET | Refills: 3 | Status: CANCELLED | OUTPATIENT
Start: 2025-03-25

## 2025-06-08 ENCOUNTER — OFFICE VISIT (OUTPATIENT)
Dept: URGENT CARE | Age: 30
End: 2025-06-08
Payer: COMMERCIAL

## 2025-06-08 VITALS
TEMPERATURE: 96.3 F | DIASTOLIC BLOOD PRESSURE: 83 MMHG | HEIGHT: 68 IN | WEIGHT: 250 LBS | SYSTOLIC BLOOD PRESSURE: 128 MMHG | OXYGEN SATURATION: 95 % | HEART RATE: 87 BPM | RESPIRATION RATE: 18 BRPM | BODY MASS INDEX: 37.89 KG/M2

## 2025-06-08 DIAGNOSIS — H66.92 LEFT ACUTE OTITIS MEDIA: ICD-10-CM

## 2025-06-08 DIAGNOSIS — J06.9 VIRAL URI: Primary | ICD-10-CM

## 2025-06-08 PROCEDURE — 3008F BODY MASS INDEX DOCD: CPT | Performed by: PHYSICIAN ASSISTANT

## 2025-06-08 PROCEDURE — 99213 OFFICE O/P EST LOW 20 MIN: CPT | Performed by: PHYSICIAN ASSISTANT

## 2025-06-08 PROCEDURE — 1036F TOBACCO NON-USER: CPT | Performed by: PHYSICIAN ASSISTANT

## 2025-06-08 RX ORDER — AMOXICILLIN 500 MG/1
500 CAPSULE ORAL EVERY 8 HOURS SCHEDULED
Qty: 21 CAPSULE | Refills: 0 | Status: SHIPPED | OUTPATIENT
Start: 2025-06-08 | End: 2025-06-15

## 2025-06-08 ASSESSMENT — PAIN SCALES - GENERAL: PAINLEVEL_OUTOF10: 0-NO PAIN

## 2025-06-08 NOTE — PROGRESS NOTES
"Subjective   Patient ID: Clair Cifuentes is a 29 y.o. female. They present today with a chief complaint of Earache and Nasal Congestion (X 1 week LT ear needs to pop cough - at home COVID test 2 days ago).    History of Present Illness  Clair is a 29 year old female presents to  with cough and congestion x 1 week. Felt like regular head cold/allergies. Some improvement in congestin with mucinex and allergy medication, ongoing ear pressure, worse to L than Right. No fevers.       Earache         Past Medical History  Allergies as of 06/08/2025    (No Known Allergies)       Prescriptions Prior to Admission[1]     Medical History[2]    Surgical History[3]     reports that she has never smoked. She has never been exposed to tobacco smoke. She has never used smokeless tobacco. She reports that she does not currently use alcohol. She reports that she does not use drugs.    Review of Systems  Review of Systems   HENT:  Positive for ear pain.                                   Objective    Vitals:    06/08/25 1651   BP: 128/83   BP Location: Right arm   Patient Position: Sitting   BP Cuff Size: Adult   Pulse: 87   Resp: 18   Temp: 35.7 °C (96.3 °F)   TempSrc: Temporal   SpO2: 95%   Weight: 113 kg (250 lb)   Height: 1.727 m (5' 8\")     No LMP recorded (lmp unknown).    Physical Exam  Vitals and nursing note reviewed.   Constitutional:       General: She is not in acute distress.     Appearance: Normal appearance.   HENT:      Head: Normocephalic and atraumatic.      Right Ear: Tympanic membrane and ear canal normal.      Left Ear: Ear canal normal. Tympanic membrane is erythematous and bulging.      Nose: Congestion present. No rhinorrhea.      Comments: No max/eth TTP      Mouth/Throat:      Mouth: Mucous membranes are moist.      Pharynx: No oropharyngeal exudate or posterior oropharyngeal erythema.   Eyes:      Extraocular Movements: Extraocular movements intact.      Conjunctiva/sclera: Conjunctivae normal.      " Pupils: Pupils are equal, round, and reactive to light.   Cardiovascular:      Rate and Rhythm: Normal rate and regular rhythm.      Heart sounds: No murmur heard.  Pulmonary:      Effort: Pulmonary effort is normal.      Breath sounds: Normal breath sounds. No wheezing.   Musculoskeletal:      Cervical back: Normal range of motion and neck supple.   Lymphadenopathy:      Cervical: No cervical adenopathy.   Skin:     General: Skin is warm and dry.   Neurological:      General: No focal deficit present.      Mental Status: She is alert and oriented to person, place, and time.   Psychiatric:         Mood and Affect: Mood normal.         Procedures    Point of Care Test & Imaging Results from this visit  No results found for this visit on 06/08/25.   Imaging  No results found.    Cardiology, Vascular, and Other Imaging  No other imaging results found for the past 2 days      Diagnostic study results (if any) were reviewed by Ira Guerra PA-C.    Assessment/Plan   Allergies, medications, history, and pertinent labs/EKGs/Imaging reviewed by Ira Guerra PA-C.     Medical Decision Making  Clair presents to  with URI symptoms x 1 week. Ongoing ear pain and pressure. Exam consistent with viral URI, secondary L AOM. Continue OTC cough/cold medications. Will add amoxil for L AOM.   Plan of care discussed with patient and/or family who verbalized understanding. Recommend Follow up with PCP. Advised seeking immediate emergency medical attention if symptoms fail to improve, worsen or any concerning symptoms arise. Patient/Guardian voiced full understanding and agreement to plan.      Orders and Diagnoses  Diagnoses and all orders for this visit:  Viral URI  Left acute otitis media  -     amoxicillin (Amoxil) 500 mg capsule; Take 1 capsule (500 mg) by mouth every 8 hours for 7 days.      Medical Admin Record      Patient disposition: Home    Electronically signed by Ira Guerra PA-C  5:22 PM           [1] (Not in  a hospital admission)   [2]   Past Medical History:  Diagnosis Date    Abnormal level of blood mineral 12/20/2018    Low iron stores    Anxiety     Depression     Fracture of upper extremity 05/21/2021    Headache     Nonscarring hair loss, unspecified 12/20/2018    Hair thinning    Nonscarring hair loss, unspecified 12/18/2017    Hair loss    Personal history of other diseases of the nervous system and sense organs 07/12/2018    History of migraine with aura    Personal history of other diseases of the nervous system and sense organs 10/01/2020    History of eustachian tube dysfunction    Personal history of other endocrine, nutritional and metabolic disease 12/20/2018    History of vitamin D deficiency    Screening for cervical cancer 05/27/2022 5/27/22 NILM    Unspecified fracture of shaft of humerus, left arm, sequela     Broken arm, left, sequela   [3]   Past Surgical History:  Procedure Laterality Date    FRACTURE SURGERY  Feburary 2016    OTHER SURGICAL HISTORY  12/18/2017    Oral Surgery Tooth Extraction [place] [facility]    OTHER SURGICAL HISTORY  10/21/2022    Forearm surgery

## 2025-06-09 ENCOUNTER — TELEPHONE (OUTPATIENT)
Dept: URGENT CARE | Age: 30
End: 2025-06-09

## 2025-06-09 PROBLEM — R00.0 TACHYCARDIA: Status: RESOLVED | Noted: 2024-09-10 | Resolved: 2025-06-09

## 2025-06-21 LAB
FERRITIN SERPL-MCNC: 30 NG/ML (ref 16–154)
IRON SATN MFR SERPL: 14 % (CALC) (ref 16–45)
IRON SERPL-MCNC: 51 MCG/DL (ref 40–190)
TIBC SERPL-MCNC: 364 MCG/DL (CALC) (ref 250–450)

## 2025-07-08 ENCOUNTER — HOSPITAL ENCOUNTER (OUTPATIENT)
Dept: CARDIOLOGY | Facility: CLINIC | Age: 30
Discharge: HOME | End: 2025-07-08
Payer: COMMERCIAL

## 2025-07-08 ENCOUNTER — OFFICE VISIT (OUTPATIENT)
Dept: CARDIOLOGY | Facility: CLINIC | Age: 30
End: 2025-07-08
Payer: COMMERCIAL

## 2025-07-08 VITALS
BODY MASS INDEX: 38.19 KG/M2 | HEART RATE: 84 BPM | WEIGHT: 252 LBS | OXYGEN SATURATION: 98 % | SYSTOLIC BLOOD PRESSURE: 125 MMHG | HEIGHT: 68 IN | DIASTOLIC BLOOD PRESSURE: 77 MMHG

## 2025-07-08 DIAGNOSIS — G90.A POTS (POSTURAL ORTHOSTATIC TACHYCARDIA SYNDROME): ICD-10-CM

## 2025-07-08 DIAGNOSIS — R07.9 CHEST PAIN, UNSPECIFIED TYPE: Primary | ICD-10-CM

## 2025-07-08 DIAGNOSIS — R07.9 CHEST PAIN, UNSPECIFIED TYPE: ICD-10-CM

## 2025-07-08 LAB — BODY SURFACE AREA: 2.34 M2

## 2025-07-08 PROCEDURE — 93242 EXT ECG>48HR<7D RECORDING: CPT

## 2025-07-08 PROCEDURE — 93005 ELECTROCARDIOGRAM TRACING: CPT | Performed by: STUDENT IN AN ORGANIZED HEALTH CARE EDUCATION/TRAINING PROGRAM

## 2025-07-08 PROCEDURE — 99212 OFFICE O/P EST SF 10 MIN: CPT

## 2025-07-08 PROCEDURE — 99214 OFFICE O/P EST MOD 30 MIN: CPT | Performed by: STUDENT IN AN ORGANIZED HEALTH CARE EDUCATION/TRAINING PROGRAM

## 2025-07-08 PROCEDURE — 3008F BODY MASS INDEX DOCD: CPT | Performed by: STUDENT IN AN ORGANIZED HEALTH CARE EDUCATION/TRAINING PROGRAM

## 2025-07-08 ASSESSMENT — ENCOUNTER SYMPTOMS
LOSS OF SENSATION IN FEET: 0
OCCASIONAL FEELINGS OF UNSTEADINESS: 0
DEPRESSION: 0

## 2025-07-08 ASSESSMENT — PAIN SCALES - GENERAL: PAINLEVEL_OUTOF10: 0-NO PAIN

## 2025-07-08 NOTE — PROGRESS NOTES
"Chief Complaint:   Follow-up     History Of Present Illness:    Clair Cifuentes is a 29 y.o. female returns to clinic for yearly appointment.  She has been doing fine.  She was not feeling so well while on atenolol as this created some urinary tract issues and frequency.  Patient will switch to: 2.5 mg twice a day and notes improvement in overall symptoms.  However more recently she has been having episodes of tightness in her chest as well as shortness of breath.  And may be a correlation with the change in weather and heat.  She has had tightness in her chest which may occur sporadically at rest without doing activities.  Once while going to the basketball game and going up bleachers she became short of breath and had to rest and take a break.  Her symptoms are exacerbated by heat.  Her most recent iron studies show low iron levels and she is on ferrous sulfate 325 mg.  Baseline ECG today shows normal sinus rhythm.  Blood pressure is 125/88.  Heart rate of 84.     Last Recorded Vitals:  Vitals:    07/08/25 1438   BP: 125/77   BP Location: Left arm   Patient Position: Sitting   Pulse: 84   SpO2: 98%   Weight: 114 kg (252 lb)   Height: 1.727 m (5' 8\")       Review of Systems  ROS      Allergies:  Patient has no known allergies.    Outpatient Medications:  Current Outpatient Medications   Medication Instructions    cholecalciferol (Vitamin D-3) 50 mcg (2,000 unit) capsule Take by mouth.    citalopram (CELEXA) 20 mg, Daily    ferrous sulfate, 325 mg ferrous sulfate, tablet Take by mouth.    ivabradine (CORLANOR) 2.5 mg, oral, 2 times daily    Lo Loestrin Fe 1 mg-10 mcg (24)/10 mcg (2) tablet 1 tablet, Daily       Physical Exam:  General: No acute distress,  A&O x3  Skin: Warm and dry  Neck: JVD is not elevated  ENT: Moist mucous membranes no lesions appreciated  Pulmonary: CTAB  Cards: Regular rate rhythm, no murmurs gallops or rubs appreciated normal S1-S2  Abdomen: Soft nontender nondistended  Extremities: No edema " or cyanosis  Psych: Appropriate mood and affect        Last Labs:  CBC -  Lab Results   Component Value Date    WBC 7.0 05/28/2024    HGB 13.7 05/28/2024    HCT 42.3 05/28/2024    MCV 86 05/28/2024     (H) 05/28/2024       CMP -  Lab Results   Component Value Date    CALCIUM 10.0 05/28/2024    PROT 7.7 05/28/2024    ALBUMIN 4.7 05/28/2024    AST 13 05/28/2024    ALT 15 05/28/2024    ALKPHOS 90 05/28/2024    BILITOT 0.8 05/28/2024       LIPID PANEL -   Lab Results   Component Value Date    CHOL 169 07/27/2022    TRIG 83 07/27/2022    HDL 64.7 07/27/2022    CHHDL 2.6 07/27/2022    LDLF 88 07/27/2022    VLDL 17 07/27/2022    NHDL 110 12/04/2020       RENAL FUNCTION PANEL -   Lab Results   Component Value Date    GLUCOSE 96 05/28/2024     05/28/2024    K 4.4 05/28/2024     05/28/2024    CO2 24 05/28/2024    ANIONGAP 15 05/28/2024    BUN 14 05/28/2024    CREATININE 0.88 05/28/2024    CALCIUM 10.0 05/28/2024    ALBUMIN 4.7 05/28/2024        Lab Results   Component Value Date    HGBA1C 5.0 07/27/2022       Last Cardiology Tests:  ECG:  No results found for this or any previous visit (from the past 4464 hours).     Echo:  No results found for this or any previous visit from the past 1095 days.       Ejection Fractions:  EF   Date/Time Value Ref Range Status   07/19/2024 02:51 PM 63 %        Cath:  CV NCDR CATHPCI V5 COLLECTION FORM     Assessment and Plan    1.  POTS: Tachycardia:   Symptoms began after exposure to COVID in November 2023.  Baseline ECG of sinus rhythm normal axis appropriately progression.  Holter monitor showed a resting heart rate that averaged 102 bpm.  Tilt table test consistent with POTS.       -symptoms are improved off of Cymbalta  -Intolerant to propranolol due to severe fatigue  - Previously on atenolol but this was discontinued due to frequency and urgency since he as well as fatigue   - Placed on ivabradine 2.5 mg twice a day with improvement of symptoms   - Endorsed chest  tightness and discomfort occurring sporadically with episodes of fatigue and shortness of breath: Symptoms may correlate with excessive heat and poor hydration  - Provide 7-day Holter monitor today and arrange for exercise stress testing  - If resting heart rate is elevated or spikes in heart rates correlate with symptoms then we will increase ivabradine to 5 mg twice a day    -Advised aggressive hydration and exercise as tolerated  -Avoid excessive caffeine or alcohol consumption  -Utilize compression hoses  -Low iron binding -given ferrous sulfate for iron replenishment     Return to clinic once testing is completed     (This note was generated with voice recognition software and may contain errors including spelling, grammar, syntax and missed recognition of what was dictated, of which may not have been fully corrected)     Aubrey Lewis MD PhD

## 2025-07-10 LAB
ATRIAL RATE: 84 BPM
P AXIS: 44 DEGREES
P OFFSET: 191 MS
P ONSET: 139 MS
PR INTERVAL: 154 MS
Q ONSET: 216 MS
QRS COUNT: 14 BEATS
QRS DURATION: 84 MS
QT INTERVAL: 380 MS
QTC CALCULATION(BAZETT): 449 MS
QTC FREDERICIA: 424 MS
R AXIS: 45 DEGREES
T AXIS: 33 DEGREES
T OFFSET: 406 MS
VENTRICULAR RATE: 84 BPM

## 2025-07-24 LAB — BODY SURFACE AREA: 2.34 M2

## 2025-07-26 ENCOUNTER — PATIENT MESSAGE (OUTPATIENT)
Dept: CARDIOLOGY | Facility: CLINIC | Age: 30
End: 2025-07-26
Payer: COMMERCIAL

## 2025-07-26 DIAGNOSIS — G90.A POTS (POSTURAL ORTHOSTATIC TACHYCARDIA SYNDROME): ICD-10-CM

## 2025-07-30 DIAGNOSIS — G90.A POTS (POSTURAL ORTHOSTATIC TACHYCARDIA SYNDROME): ICD-10-CM

## 2025-07-31 RX ORDER — IVABRADINE 5 MG/1
5 TABLET, FILM COATED ORAL 2 TIMES DAILY
Qty: 90 TABLET | Refills: 3 | Status: SHIPPED | OUTPATIENT
Start: 2025-07-31

## 2025-08-01 DIAGNOSIS — G90.A POTS (POSTURAL ORTHOSTATIC TACHYCARDIA SYNDROME): ICD-10-CM

## 2025-08-20 ENCOUNTER — HOSPITAL ENCOUNTER (OUTPATIENT)
Dept: CARDIOLOGY | Facility: CLINIC | Age: 30
Discharge: HOME | End: 2025-08-20
Payer: COMMERCIAL

## 2025-08-20 DIAGNOSIS — G90.A POTS (POSTURAL ORTHOSTATIC TACHYCARDIA SYNDROME): ICD-10-CM

## 2025-08-20 DIAGNOSIS — R07.9 CHEST PAIN, UNSPECIFIED TYPE: ICD-10-CM

## 2025-08-20 PROCEDURE — 93016 CV STRESS TEST SUPVJ ONLY: CPT | Performed by: STUDENT IN AN ORGANIZED HEALTH CARE EDUCATION/TRAINING PROGRAM

## 2025-08-20 PROCEDURE — 93018 CV STRESS TEST I&R ONLY: CPT | Performed by: STUDENT IN AN ORGANIZED HEALTH CARE EDUCATION/TRAINING PROGRAM

## 2025-08-20 PROCEDURE — 93017 CV STRESS TEST TRACING ONLY: CPT

## 2025-08-28 ENCOUNTER — OFFICE VISIT (OUTPATIENT)
Dept: CARDIOLOGY | Facility: CLINIC | Age: 30
End: 2025-08-28
Payer: COMMERCIAL

## 2025-08-28 ASSESSMENT — ENCOUNTER SYMPTOMS
DEPRESSION: 0
LOSS OF SENSATION IN FEET: 0
OCCASIONAL FEELINGS OF UNSTEADINESS: 0

## 2025-08-28 ASSESSMENT — PATIENT HEALTH QUESTIONNAIRE - PHQ9
1. LITTLE INTEREST OR PLEASURE IN DOING THINGS: NOT AT ALL
2. FEELING DOWN, DEPRESSED OR HOPELESS: NOT AT ALL
SUM OF ALL RESPONSES TO PHQ9 QUESTIONS 1 AND 2: 0

## 2025-08-28 ASSESSMENT — PAIN SCALES - GENERAL: PAINLEVEL_OUTOF10: 0-NO PAIN

## 2025-08-28 ASSESSMENT — COLUMBIA-SUICIDE SEVERITY RATING SCALE - C-SSRS
2. HAVE YOU ACTUALLY HAD ANY THOUGHTS OF KILLING YOURSELF?: NO
6. HAVE YOU EVER DONE ANYTHING, STARTED TO DO ANYTHING, OR PREPARED TO DO ANYTHING TO END YOUR LIFE?: NO
1. IN THE PAST MONTH, HAVE YOU WISHED YOU WERE DEAD OR WISHED YOU COULD GO TO SLEEP AND NOT WAKE UP?: NO